# Patient Record
Sex: MALE | Employment: FULL TIME | URBAN - METROPOLITAN AREA
[De-identification: names, ages, dates, MRNs, and addresses within clinical notes are randomized per-mention and may not be internally consistent; named-entity substitution may affect disease eponyms.]

---

## 2017-02-12 ENCOUNTER — TELEPHONE (OUTPATIENT)
Dept: FAMILY MEDICINE CLINIC | Facility: CLINIC | Age: 52
End: 2017-02-12

## 2017-02-13 RX ORDER — LISINOPRIL 10 MG/1
TABLET ORAL
Qty: 90 TABLET | Refills: 0 | Status: SHIPPED | OUTPATIENT
Start: 2017-02-13 | End: 2017-03-10

## 2017-03-02 NOTE — TELEPHONE ENCOUNTER
Pt has appt for 3/10 for med check / bp check however he has made his annual physical for 5/1 can he cancel the 3/10 appt?

## 2017-03-10 ENCOUNTER — OFFICE VISIT (OUTPATIENT)
Dept: FAMILY MEDICINE CLINIC | Facility: CLINIC | Age: 52
End: 2017-03-10

## 2017-03-10 VITALS
BODY MASS INDEX: 26 KG/M2 | TEMPERATURE: 97 F | RESPIRATION RATE: 16 BRPM | HEART RATE: 60 BPM | SYSTOLIC BLOOD PRESSURE: 122 MMHG | WEIGHT: 195 LBS | DIASTOLIC BLOOD PRESSURE: 74 MMHG

## 2017-03-10 DIAGNOSIS — Z85.46 PERSONAL HISTORY OF PROSTATE CANCER: ICD-10-CM

## 2017-03-10 DIAGNOSIS — E78.00 PURE HYPERCHOLESTEROLEMIA: ICD-10-CM

## 2017-03-10 DIAGNOSIS — I10 ESSENTIAL HYPERTENSION: Primary | ICD-10-CM

## 2017-03-10 DIAGNOSIS — Z13.0 SCREENING, ANEMIA, DEFICIENCY, IRON: ICD-10-CM

## 2017-03-10 PROCEDURE — 99214 OFFICE O/P EST MOD 30 MIN: CPT | Performed by: FAMILY MEDICINE

## 2017-03-10 RX ORDER — LISINOPRIL 10 MG/1
10 TABLET ORAL
Qty: 90 TABLET | Refills: 1 | Status: SHIPPED | OUTPATIENT
Start: 2017-03-10 | End: 2017-10-30

## 2017-03-10 NOTE — PROGRESS NOTES
Junior Johnson is a 46year old male. HPI:   Patient presents for recheck of his hypertension. Pt has been taking medications as instructed, no medication side effects, home BP monitoring in the range of 759-822'M systolic and 67'W diastolic.  The patient tibia    • Personal history of malignant neoplasm of prostate    • HIGH BLOOD PRESSURE    • HIGH CHOLESTEROL    • PONV (postoperative nausea and vomiting)           Past Surgical History    OTHER SURGICAL HISTORY  2004    Comment Biopsy Lymph Node    COLON Visit:  Signed Prescriptions Disp Refills    lisinopril 10 MG Oral Tab 90 tablet 1      Sig: Take 1 tablet (10 mg total) by mouth once daily.            Imaging & Consults:  None

## 2017-04-25 ENCOUNTER — LAB ENCOUNTER (OUTPATIENT)
Dept: LAB | Age: 52
End: 2017-04-25
Attending: FAMILY MEDICINE
Payer: COMMERCIAL

## 2017-04-25 DIAGNOSIS — Z85.46 PERSONAL HISTORY OF PROSTATE CANCER: ICD-10-CM

## 2017-04-25 DIAGNOSIS — I10 ESSENTIAL HYPERTENSION: ICD-10-CM

## 2017-04-25 DIAGNOSIS — Z13.0 SCREENING, ANEMIA, DEFICIENCY, IRON: ICD-10-CM

## 2017-04-25 PROCEDURE — 85025 COMPLETE CBC W/AUTO DIFF WBC: CPT | Performed by: FAMILY MEDICINE

## 2017-04-25 PROCEDURE — 80061 LIPID PANEL: CPT | Performed by: FAMILY MEDICINE

## 2017-04-25 PROCEDURE — 36415 COLL VENOUS BLD VENIPUNCTURE: CPT | Performed by: FAMILY MEDICINE

## 2017-04-25 PROCEDURE — 80053 COMPREHEN METABOLIC PANEL: CPT | Performed by: FAMILY MEDICINE

## 2017-04-25 PROCEDURE — 84153 ASSAY OF PSA TOTAL: CPT | Performed by: FAMILY MEDICINE

## 2017-05-01 ENCOUNTER — OFFICE VISIT (OUTPATIENT)
Dept: FAMILY MEDICINE CLINIC | Facility: CLINIC | Age: 52
End: 2017-05-01

## 2017-05-01 VITALS
SYSTOLIC BLOOD PRESSURE: 118 MMHG | HEART RATE: 72 BPM | DIASTOLIC BLOOD PRESSURE: 74 MMHG | TEMPERATURE: 98 F | BODY MASS INDEX: 26.01 KG/M2 | WEIGHT: 192 LBS | HEIGHT: 72 IN | RESPIRATION RATE: 16 BRPM

## 2017-05-01 DIAGNOSIS — Z00.00 ANNUAL PHYSICAL EXAM: Primary | ICD-10-CM

## 2017-05-01 DIAGNOSIS — E78.00 PURE HYPERCHOLESTEROLEMIA: ICD-10-CM

## 2017-05-01 DIAGNOSIS — Z85.46 PERSONAL HISTORY OF PROSTATE CANCER: ICD-10-CM

## 2017-05-01 DIAGNOSIS — I10 ESSENTIAL HYPERTENSION WITH GOAL BLOOD PRESSURE LESS THAN 130/85: ICD-10-CM

## 2017-05-01 PROCEDURE — 99396 PREV VISIT EST AGE 40-64: CPT | Performed by: FAMILY MEDICINE

## 2017-05-01 RX ORDER — PRAVASTATIN SODIUM 10 MG
10 TABLET ORAL NIGHTLY
Qty: 90 TABLET | Refills: 1 | Status: SHIPPED | OUTPATIENT
Start: 2017-05-01 | End: 2017-10-30

## 2017-05-01 NOTE — PROGRESS NOTES
Keyla Kay is a 46year old male who presents for a complete physical exam.   HPI:   Pt has been keeping his HTN under good control. No chest pain or SOB. No sxs related to his prostate ca and removal. Specifically, no incontinence.   He no longer has to Disp: 90 tablet Rfl: 1   lisinopril 10 MG Oral Tab Take 1 tablet (10 mg total) by mouth once daily. Disp: 90 tablet Rfl: 1   Sildenafil Citrate (VIAGRA) 25 MG Oral Tab Take 1 tablet by mouth daily as needed for Erectile Dysfunction.  Disp: 36 tablet Rfl: 3 denies chest pain on exertion  GI: denies abdominal pain,denies heartburn  : denies nocturia or changes in stream  MUSCULOSKELETAL: denies back pain  NEURO: denies headaches  PSYCHE: denies depression or anxiety  HEMATOLOGIC: denies hx of anemia  ENDOCRI months  The patient is asked to return for CPX in 1 year

## 2017-05-21 ENCOUNTER — APPOINTMENT (OUTPATIENT)
Dept: CT IMAGING | Facility: HOSPITAL | Age: 52
DRG: 419 | End: 2017-05-21
Attending: EMERGENCY MEDICINE
Payer: COMMERCIAL

## 2017-05-21 ENCOUNTER — HOSPITAL ENCOUNTER (INPATIENT)
Facility: HOSPITAL | Age: 52
LOS: 5 days | Discharge: HOME | DRG: 419 | End: 2017-05-26
Attending: EMERGENCY MEDICINE | Admitting: HOSPITALIST
Payer: COMMERCIAL

## 2017-05-21 ENCOUNTER — APPOINTMENT (OUTPATIENT)
Dept: ULTRASOUND IMAGING | Facility: HOSPITAL | Age: 52
DRG: 419 | End: 2017-05-21
Attending: EMERGENCY MEDICINE
Payer: COMMERCIAL

## 2017-05-21 DIAGNOSIS — K83.1 BILIARY OBSTRUCTION: Primary | ICD-10-CM

## 2017-05-21 PROCEDURE — 99223 1ST HOSP IP/OBS HIGH 75: CPT | Performed by: HOSPITALIST

## 2017-05-21 PROCEDURE — 74177 CT ABD & PELVIS W/CONTRAST: CPT | Performed by: EMERGENCY MEDICINE

## 2017-05-21 PROCEDURE — 76700 US EXAM ABDOM COMPLETE: CPT | Performed by: EMERGENCY MEDICINE

## 2017-05-21 RX ORDER — MORPHINE SULFATE 4 MG/ML
4 INJECTION, SOLUTION INTRAMUSCULAR; INTRAVENOUS EVERY 30 MIN PRN
Status: DISCONTINUED | OUTPATIENT
Start: 2017-05-21 | End: 2017-05-24 | Stop reason: HOSPADM

## 2017-05-21 RX ORDER — HYDROMORPHONE HYDROCHLORIDE 1 MG/ML
0.8 INJECTION, SOLUTION INTRAMUSCULAR; INTRAVENOUS; SUBCUTANEOUS EVERY 2 HOUR PRN
Status: DISCONTINUED | OUTPATIENT
Start: 2017-05-21 | End: 2017-05-24

## 2017-05-21 RX ORDER — IBUPROFEN 400 MG/1
400 TABLET ORAL EVERY 4 HOURS PRN
Status: DISCONTINUED | OUTPATIENT
Start: 2017-05-21 | End: 2017-05-26

## 2017-05-21 RX ORDER — HYDROMORPHONE HYDROCHLORIDE 1 MG/ML
0.4 INJECTION, SOLUTION INTRAMUSCULAR; INTRAVENOUS; SUBCUTANEOUS EVERY 2 HOUR PRN
Status: DISCONTINUED | OUTPATIENT
Start: 2017-05-21 | End: 2017-05-24

## 2017-05-21 RX ORDER — HYDROMORPHONE HYDROCHLORIDE 1 MG/ML
0.5 INJECTION, SOLUTION INTRAMUSCULAR; INTRAVENOUS; SUBCUTANEOUS EVERY 30 MIN PRN
Status: DISCONTINUED | OUTPATIENT
Start: 2017-05-21 | End: 2017-05-21

## 2017-05-21 RX ORDER — IBUPROFEN 400 MG/1
200 TABLET ORAL EVERY 4 HOURS PRN
Status: DISCONTINUED | OUTPATIENT
Start: 2017-05-21 | End: 2017-05-26

## 2017-05-21 RX ORDER — ONDANSETRON 2 MG/ML
4 INJECTION INTRAMUSCULAR; INTRAVENOUS EVERY 4 HOURS PRN
Status: DISCONTINUED | OUTPATIENT
Start: 2017-05-21 | End: 2017-05-21

## 2017-05-21 RX ORDER — SODIUM CHLORIDE 9 MG/ML
INJECTION, SOLUTION INTRAVENOUS CONTINUOUS
Status: DISCONTINUED | OUTPATIENT
Start: 2017-05-21 | End: 2017-05-26

## 2017-05-21 RX ORDER — IBUPROFEN 600 MG/1
600 TABLET ORAL EVERY 4 HOURS PRN
Status: DISCONTINUED | OUTPATIENT
Start: 2017-05-21 | End: 2017-05-26

## 2017-05-21 RX ORDER — HYDROMORPHONE HYDROCHLORIDE 1 MG/ML
0.2 INJECTION, SOLUTION INTRAMUSCULAR; INTRAVENOUS; SUBCUTANEOUS EVERY 2 HOUR PRN
Status: DISCONTINUED | OUTPATIENT
Start: 2017-05-21 | End: 2017-05-24

## 2017-05-21 RX ORDER — LISINOPRIL 10 MG/1
10 TABLET ORAL DAILY
Status: DISCONTINUED | OUTPATIENT
Start: 2017-05-21 | End: 2017-05-26

## 2017-05-21 RX ORDER — SODIUM CHLORIDE 9 MG/ML
INJECTION, SOLUTION INTRAVENOUS CONTINUOUS
Status: ACTIVE | OUTPATIENT
Start: 2017-05-21 | End: 2017-05-22

## 2017-05-21 RX ORDER — ONDANSETRON 2 MG/ML
4 INJECTION INTRAMUSCULAR; INTRAVENOUS EVERY 4 HOURS PRN
Status: DISCONTINUED | OUTPATIENT
Start: 2017-05-21 | End: 2017-05-26

## 2017-05-21 NOTE — ED INITIAL ASSESSMENT (HPI)
Pt here with right sided epigastric pain stating \"its my gallbladder\". Pt stating onset Friday morning after eating macaroni and cheese, chips and popcorn Thursday night. Pt stating about 6mos ago he was dx gallstone but then it wasn't seen on CT.  Liver

## 2017-05-21 NOTE — CONSULTS
[facility] Patient Status:  Emergency   Date of Birth 10/9/1965 MRN [id number]   Location [facility] Attending Azul Ramirez MD   [facility] Day [facility] 0 PCP Ming Siu MD     Vic 1 tablet by mouth daily as needed for Erectile Dysfunction. , Start Date 3/30/15, End Date , Taking? , Authorizing Provider Aviva Gaxiola MD          Current Facility-Administered Medications:  morphINE sulfate (PF) 4 MG/ML injection 4 mg 4 mg Aviva Gaxiola COMPLETE (CPT=76700), 11/22/2016, 12:34.      INDICATIONS:  abdominal problems      TECHNIQUE:  Real time gray-scale ultrasound was used to evaluate the abdomen.  The exam includes images of the liver, gallbladder, common bile duct, pancreas, spleen, kidne or focal lesion.     KIDNEYS:  Normal.  No mass, obstruction, or calcification.     ADRENALS:  Normal.  No mass or enlargement.     AORTA/VASCULAR:  Normal.  No aneurysm or dissection.     RETROPERITONEUM:  Normal.  No mass or adenopathy.     BOWEL/MESENTE

## 2017-05-21 NOTE — ED PROVIDER NOTES
Patient Seen in: [facility] Emergency Department    History   Patient presents with:  Abdomen/Flank Pain (GI/)    Stated Complaint: abdominal pain    HPI    Patient is a 49-year-old with a history of hypertension, high cholesterol.   He presents fo carcinoma [Other] [OTHER] Other      family hx   • Breast Cancer Mother    • Cancer Father      Colon   • Diabetes Father    • Cancer Mother      skin   • Stroke Maternal Grandfather          Smoking Status: Never Smoker                      Smokeless Stat LIPASE - Normal   CBC WITH DIFFERENTIAL WITH PLATELET    Narrative: The following orders were created for panel order CBC WITH DIFFERENTIAL WITH PLATELET.   Procedure                               Abnormality         Status                     ------- thickening, lesion, or calculus.    PELVIC NODES:  Normal.  No adenopathy.    PELVIC ORGANS:  Normal.  No visible mass.  Pelvic organs appropriate for patient age.    BONES:  Normal.  No bony lesion or fracture.    LUNG BASES:  Normal.  No visible pulmonar

## 2017-05-21 NOTE — ED NOTES
Dr. Shamika Van and Dr. Re Guadalupe rto bs for eval . Pt aware of admit. Family at bs.  Pt denies pain at this time

## 2017-05-21 NOTE — H&P
[facility]  History and Physical     Alveta How Patient Status:  Emergency    10/9/1965 MRN [id number]   Location 6[address] [facility] Attending Liliana Mcmanus MD   Hosp Day # 0 PCP Erinn Lyons MD     Chief Complaint: [address]    COLONOSCOPY  10/18/2013    Comment Procedure: COLONOSCOPY;  Surgeon: Rosa M Hernandez MD;  Location: [facility] ENDOSCOPY     Social History:  reports that he has never smoked.  He has never used smokeless tobacco. He repor extremities. Extremities: No edema. No cyanosis. Integument: No rashes or lesions. Psychiatric: Appropriate mood and affect.   Diagnostic Data:    Labs:  Recent Labs   Lab  05/21/17   1038   WBC  5.5   HGB  17.1*   MCV  88.2   PLT  196.0     Recent Labs

## 2017-05-22 ENCOUNTER — APPOINTMENT (OUTPATIENT)
Dept: MRI IMAGING | Facility: HOSPITAL | Age: 52
DRG: 419 | End: 2017-05-22
Attending: HOSPITALIST
Payer: COMMERCIAL

## 2017-05-22 PROCEDURE — 74183 MRI ABD W/O CNTR FLWD CNTR: CPT | Performed by: HOSPITALIST

## 2017-05-22 PROCEDURE — 99254 IP/OBS CNSLTJ NEW/EST MOD 60: CPT | Performed by: SURGERY

## 2017-05-22 PROCEDURE — 99232 SBSQ HOSP IP/OBS MODERATE 35: CPT | Performed by: HOSPITALIST

## 2017-05-22 NOTE — PROGRESS NOTES
[facility]  Progress Note     Rose Gonsalez Patient Status:  Observation    10/9/1965 MRN [id number]   [facility] 3SW-A Attending Becky Mccarthy MD   Hosp Day # 1 PCP Viktoriya Shaw MD     Chief Complaint: Abd pain  S:  Pain im this point Mr. Saw Hayes is expected to be discharge to: Kylie Michele MD

## 2017-05-22 NOTE — PROGRESS NOTES
[facility]    Progress Note    Keyla Kay Patient Status:  Observation    10/9/1965 MRN [id number]   [facility] 3SW-A Attending Jose Rebolledo MD   Hosp Day # 1 PCP Krysta Barajas MD     Subjective:  Keyla Kay is a(n) 51 year considered regardless.   Discussed with pt who is in agreement with the plan    Lisa Billings  5/22/2017  3:42 PM

## 2017-05-22 NOTE — CONSULTS
Report of Consultation    Benedict Edgar Patient Status:  Observation    10/9/1965 MRN [id number]   [facility] 3SW-A Attending Frederic Gamboa MD   Hosp Day # 1 PCP Carissa Merrill MD     Reason for Consultation:  Right upper quadrant abd stopped for that reason.       History:  Past Medical History   Diagnosis Date   • Benign neoplasm of colon    • Unspecified part of closed fracture of clavicle    • Closed fracture of unspecified part of tibia    • Personal history of malignant neoplasm of injection 0.4 mg, 0.4 mg, Intravenous, Q2H PRN **OR** HYDROmorphone HCl PF (DILAUDID) 1 MG/ML injection 0.8 mg, 0.8 mg, Intravenous, Q2H PRN    Review of Systems:    Allergic/Immuno:  Review of patient's allergies indicates no known allergies.   He has an i tenderness to palpation with deep palpation into the right upper quadrant, bowel sounds are present and of normal activity and normal pitch. No rebound or guarding. No signs of peritonitis. No ascites. No appreciable hernias.     Extremities:  No lower CONTRAST, NO ORAL (ER)      COMPARISON:  [facility] CT ABD(C/S)/PELVIS(C) (SET), 1/28/2010, 8:54. [facility] US ABDOMEN COMPLETE (CPT=76700), 5/21/2017, 11:01.       INDICATIONS:  abdominal pain      TECHNIQUE:  CT scanning was performed from the dome of the d Approved by: Meek Lujan MD            Impression:  Patient Active Problem List:     Essential hypertension     Hyperlipidemia     Annual physical exam     Personal history of prostate cancer     Screening, anemia, deficiency, iron     Screening f

## 2017-05-22 NOTE — PAYOR COMM NOTE
Attending Physician: Laurie Granados MD    Review Type: ADMISSION   Reviewer: Madelyn Mckay       Date: May 22, 2017 - 9:08 AM  Payor: [id number] [facility] Number: N/A  Admit date: 5/21/2017 10:12 AM   Admitted from Emergency Dept.: Yes MD;  Location:  ENDOSCOPY      Medications :   Pravastatin Sodium 10 MG Oral Tab,  Take 1 tablet (10 mg total) by mouth nightly.   lisinopril 10 MG Oral Tab,  Take 1 tablet (10 mg total) by mouth once daily.     Family History    Problem  Relation  Age o All other components within normal limits    CBC W/ DIFFERENTIAL - Abnormal; Notable for the following:       HGB  17.1 (*)        All other components within normal limits    LIPASE - Normal    CBC WITH DIFFERENTIAL WITH PLATELET      EKG  Rate, intervals pain is essentially resolved.   MDM    35-year-old with very typical biliary symptoms and now with elevated AST, ALT and bilirubin of 3.1.  Normal white count.  Normal lipase.  No abnormalities on ultrasound or CT.  He will be admitted for likely MRCP, cons and emesis earlier today.  He denies hematemesis, melena or hematochezia.  He denies jaundice or icterus.  He denies fevers or chills.   Patient states he has been recently switched to a subsequent cholesterol medication over the last couple of weeks.  The Abnormal; Notable for the following:      (*)     Alt 287 (*)     Sodium 145 (*)     All other components within normal limits   HEPATIC FUNCTION PANEL (7) - Abnormal; Notable for the following:      (*)     Alt 287 (*)     Bilirubin, Direct

## 2017-05-22 NOTE — PLAN OF CARE
GASTROINTESTINAL - ADULT    • Minimal or absence of nausea and vomiting Progressing        PAIN - ADULT    • Verbalizes/displays adequate comfort level or patient's stated pain goal Progressing        Abdomen soft and not tender on palpation.   Bretties seth

## 2017-05-23 PROCEDURE — 99232 SBSQ HOSP IP/OBS MODERATE 35: CPT | Performed by: HOSPITALIST

## 2017-05-23 PROCEDURE — 99232 SBSQ HOSP IP/OBS MODERATE 35: CPT | Performed by: SURGERY

## 2017-05-23 NOTE — PLAN OF CARE
GASTROINTESTINAL - ADULT    • Minimal or absence of nausea and vomiting Progressing        PAIN - ADULT    • Verbalizes/displays adequate comfort level or patient's stated pain goal Progressing        Patient maintained on NPO until cleared by GI and surge

## 2017-05-23 NOTE — PROGRESS NOTES
[facility]    Progress Note    Corey Borjas Patient Status:  Inpatient    10/9/1965 MRN [id number]   [facility] 3SW-A Attending Ban Pablo MD   Hosp Day # 2 PCP Iline Felty, MD     Subjective:  Corey Borjas is a(n) 46 year o

## 2017-05-23 NOTE — PROGRESS NOTES
[facility]  Progress Note     Juinor Even Patient Status:  Observation    10/9/1965 MRN [id number]   [facility] 3SW-A Attending Long Kwong MD   Hosp Day # 2 PCP Kesha Guillermo MD     Chief Complaint: Abd pain  S:  No Pain pending post-op course   At this point Mr. Manfred Helms is expected to be discharge to: Enedina Burrell MD

## 2017-05-23 NOTE — PROGRESS NOTES
[facility]  Progress Note    Keyla Kay Patient Status:  Observation    10/9/1965 MRN [id number]   [facility] 3SW-A Attending Jose Rebolledo MD   Hosp Day # 2 PCP Krysta Barajas MD     Subjective: Patient denies abdominal pain wi patient proceed for laparoscopic possible open cholecystectomy with cholangiogram I discussed with the patient the risks of surgery to include bleeding infection pneumonia DVT PE and injury to the common bile duct with a second major operation to repair al

## 2017-05-24 ENCOUNTER — ANESTHESIA (OUTPATIENT)
Dept: SURGERY | Facility: HOSPITAL | Age: 52
End: 2017-05-24

## 2017-05-24 ENCOUNTER — APPOINTMENT (OUTPATIENT)
Dept: GENERAL RADIOLOGY | Facility: HOSPITAL | Age: 52
DRG: 419 | End: 2017-05-24
Attending: SURGERY
Payer: COMMERCIAL

## 2017-05-24 ENCOUNTER — SURGERY (OUTPATIENT)
Age: 52
End: 2017-05-24

## 2017-05-24 ENCOUNTER — ANESTHESIA EVENT (OUTPATIENT)
Dept: SURGERY | Facility: HOSPITAL | Age: 52
End: 2017-05-24

## 2017-05-24 PROCEDURE — 76000 FLUOROSCOPY <1 HR PHYS/QHP: CPT | Performed by: SURGERY

## 2017-05-24 PROCEDURE — 0FT44ZZ RESECTION OF GALLBLADDER, PERCUTANEOUS ENDOSCOPIC APPROACH: ICD-10-PCS | Performed by: SURGERY

## 2017-05-24 PROCEDURE — 99232 SBSQ HOSP IP/OBS MODERATE 35: CPT | Performed by: INTERNAL MEDICINE

## 2017-05-24 PROCEDURE — 47563 LAPARO CHOLECYSTECTOMY/GRAPH: CPT | Performed by: SURGERY

## 2017-05-24 PROCEDURE — BF131ZZ FLUOROSCOPY OF GALLBLADDER AND BILE DUCTS USING LOW OSMOLAR CONTRAST: ICD-10-PCS | Performed by: SURGERY

## 2017-05-24 RX ORDER — SODIUM CHLORIDE, SODIUM LACTATE, POTASSIUM CHLORIDE, CALCIUM CHLORIDE 600; 310; 30; 20 MG/100ML; MG/100ML; MG/100ML; MG/100ML
INJECTION, SOLUTION INTRAVENOUS CONTINUOUS
Status: DISCONTINUED | OUTPATIENT
Start: 2017-05-24 | End: 2017-05-26

## 2017-05-24 RX ORDER — ENALAPRILAT 2.5 MG/2ML
1.25 INJECTION INTRAVENOUS ONCE
Status: COMPLETED | OUTPATIENT
Start: 2017-05-24 | End: 2017-05-24

## 2017-05-24 RX ORDER — DEXAMETHASONE SODIUM PHOSPHATE 4 MG/ML
4 VIAL (ML) INJECTION AS NEEDED
Status: DISCONTINUED | OUTPATIENT
Start: 2017-05-24 | End: 2017-05-24 | Stop reason: HOSPADM

## 2017-05-24 RX ORDER — MIDAZOLAM HYDROCHLORIDE 1 MG/ML
1 INJECTION INTRAMUSCULAR; INTRAVENOUS EVERY 5 MIN PRN
Status: DISCONTINUED | OUTPATIENT
Start: 2017-05-24 | End: 2017-05-24 | Stop reason: HOSPADM

## 2017-05-24 RX ORDER — HYDROCODONE BITARTRATE AND ACETAMINOPHEN 5; 325 MG/1; MG/1
2 TABLET ORAL AS NEEDED
Status: DISCONTINUED | OUTPATIENT
Start: 2017-05-24 | End: 2017-05-24 | Stop reason: HOSPADM

## 2017-05-24 RX ORDER — ONDANSETRON 2 MG/ML
4 INJECTION INTRAMUSCULAR; INTRAVENOUS AS NEEDED
Status: DISCONTINUED | OUTPATIENT
Start: 2017-05-24 | End: 2017-05-24 | Stop reason: HOSPADM

## 2017-05-24 RX ORDER — HYDROCODONE BITARTRATE AND ACETAMINOPHEN 5; 325 MG/1; MG/1
1 TABLET ORAL EVERY 4 HOURS PRN
Status: DISCONTINUED | OUTPATIENT
Start: 2017-05-24 | End: 2017-05-26

## 2017-05-24 RX ORDER — HYDROMORPHONE HYDROCHLORIDE 1 MG/ML
0.4 INJECTION, SOLUTION INTRAMUSCULAR; INTRAVENOUS; SUBCUTANEOUS EVERY 5 MIN PRN
Status: DISCONTINUED | OUTPATIENT
Start: 2017-05-24 | End: 2017-05-24 | Stop reason: HOSPADM

## 2017-05-24 RX ORDER — HYDROCODONE BITARTRATE AND ACETAMINOPHEN 5; 325 MG/1; MG/1
1 TABLET ORAL AS NEEDED
Status: DISCONTINUED | OUTPATIENT
Start: 2017-05-24 | End: 2017-05-24 | Stop reason: HOSPADM

## 2017-05-24 RX ORDER — ACETAMINOPHEN 325 MG/1
650 TABLET ORAL EVERY 4 HOURS PRN
Status: DISCONTINUED | OUTPATIENT
Start: 2017-05-24 | End: 2017-05-26

## 2017-05-24 RX ORDER — HYDROMORPHONE HYDROCHLORIDE 1 MG/ML
0.8 INJECTION, SOLUTION INTRAMUSCULAR; INTRAVENOUS; SUBCUTANEOUS EVERY 2 HOUR PRN
Status: DISCONTINUED | OUTPATIENT
Start: 2017-05-24 | End: 2017-05-26

## 2017-05-24 RX ORDER — HYDROCODONE BITARTRATE AND ACETAMINOPHEN 5; 325 MG/1; MG/1
2 TABLET ORAL EVERY 4 HOURS PRN
Status: DISCONTINUED | OUTPATIENT
Start: 2017-05-24 | End: 2017-05-26

## 2017-05-24 RX ORDER — MEPERIDINE HYDROCHLORIDE 25 MG/ML
12.5 INJECTION INTRAMUSCULAR; INTRAVENOUS; SUBCUTANEOUS AS NEEDED
Status: DISCONTINUED | OUTPATIENT
Start: 2017-05-24 | End: 2017-05-24 | Stop reason: HOSPADM

## 2017-05-24 RX ORDER — HYDRALAZINE HYDROCHLORIDE 20 MG/ML
10 INJECTION INTRAMUSCULAR; INTRAVENOUS EVERY 4 HOURS PRN
Status: DISCONTINUED | OUTPATIENT
Start: 2017-05-24 | End: 2017-05-26

## 2017-05-24 RX ORDER — HYDROMORPHONE HYDROCHLORIDE 1 MG/ML
0.4 INJECTION, SOLUTION INTRAMUSCULAR; INTRAVENOUS; SUBCUTANEOUS EVERY 2 HOUR PRN
Status: DISCONTINUED | OUTPATIENT
Start: 2017-05-24 | End: 2017-05-26

## 2017-05-24 RX ORDER — ENOXAPARIN SODIUM 100 MG/ML
40 INJECTION SUBCUTANEOUS DAILY
Status: DISCONTINUED | OUTPATIENT
Start: 2017-05-24 | End: 2017-05-26

## 2017-05-24 RX ORDER — DIPHENHYDRAMINE HYDROCHLORIDE 50 MG/ML
12.5 INJECTION INTRAMUSCULAR; INTRAVENOUS AS NEEDED
Status: DISCONTINUED | OUTPATIENT
Start: 2017-05-24 | End: 2017-05-24 | Stop reason: HOSPADM

## 2017-05-24 RX ORDER — NALOXONE HYDROCHLORIDE 0.4 MG/ML
80 INJECTION, SOLUTION INTRAMUSCULAR; INTRAVENOUS; SUBCUTANEOUS AS NEEDED
Status: DISCONTINUED | OUTPATIENT
Start: 2017-05-24 | End: 2017-05-24 | Stop reason: HOSPADM

## 2017-05-24 RX ORDER — HYDROMORPHONE HYDROCHLORIDE 1 MG/ML
INJECTION, SOLUTION INTRAMUSCULAR; INTRAVENOUS; SUBCUTANEOUS
Status: COMPLETED
Start: 2017-05-24 | End: 2017-05-24

## 2017-05-24 RX ORDER — BUPIVACAINE HYDROCHLORIDE 5 MG/ML
INJECTION, SOLUTION EPIDURAL; INTRACAUDAL AS NEEDED
Status: DISCONTINUED | OUTPATIENT
Start: 2017-05-24 | End: 2017-05-24 | Stop reason: HOSPADM

## 2017-05-24 NOTE — PROGRESS NOTES
[facility]    Progress Note    Tobi Garcia Patient Status:  Inpatient    10/9/1965 MRN [id number]   [facility] 3SW-A Attending Nabil Barrera MD   Hosp Day # 3 PCP Rochelle Valdes MD     Subjective:  Tobi Garcia is a(n) 46 year ol

## 2017-05-24 NOTE — OPERATIVE REPORT
[facility]    PATIENT'S NAME: Judy Dia PHYSICIAN: Yasmine Agrawal M.D.    OPERATING PHYSICIAN: Vlad Man D.O.   PATIENT ACCOUNT#:   [de-identified]    LOCATION:  PACU [address] [place] [address] [facility] #:   [id number]       DATE OF BIRTH: advanced into the cystic duct, and C-arm cholangiography carried out demonstrating evidence of apparent filling defect in the distal common bile duct. This defect had a somewhat lobular appearance.   It was uncertain whether this represented mass or stones

## 2017-05-24 NOTE — PROGRESS NOTES
[facility]  Progress Note     Ronald Patel Patient Status:  Inpatient    10/9/1965 MRN [id number]   [facility] 3SW-A Attending Nathalie Lovell MD   Hosp Day # 3 PCP Ana Marie MD     Chief Complaint: abd soreness    S: Patient enzymes - per #1, CMP in am   3. HTN - cont current management   4.  Dyslipidemia - off statin   Quality:  · DVT Prophylaxis: lovenox  · CODE status: full  · Jacob: none  · Central line: none    Estimated date of discharge: TBD  Discharge is dependent on: c

## 2017-05-24 NOTE — ANESTHESIA PREPROCEDURE EVALUATION
PRE-OP EVALUATION    Patient Name: Diana Fox    Pre-op Diagnosis: Cholecystitis [K81.9]    Procedure(s):  LAPAROSCOPIC CHOLECYSTECOMY WITH INTRAOPERATIVE CHOLANGIOGRAM    Surgeon(s) and Role:     * Jabari Betancourt,  - Danny    Pre-op vitals reviewed. 1 tablet (10 mg total) by mouth once daily. Disp: 90 tablet Rfl: 1       Allergies: Review of patient's allergies indicates no known allergies.       Anesthesia Evaluation        Anesthetic Complications           GI/Hepatic/Renal guidelines. Patient has not taken beta blockers in last 24 hours. Post-procedure pain management plan discussed with surgeon and patient.       Plan/risks discussed with: patient and spouse                Present on Admission:   • Essential hypertension

## 2017-05-24 NOTE — ANESTHESIA POSTPROCEDURE EVALUATION
Missael Patient Status:  Inpatient   Age/Gender 46year old male MRN [id number]   [facility] SURGERY Attending Sg Ramírez, [address] [facility] Se Day # 3 PCP Luiz Mckeon MD       Anesthesia Post-op Note    Procedure(s):  LA

## 2017-05-24 NOTE — PLAN OF CARE
Patient returned to unit from PACU at 1525, family present. Patient sleeping on and off. Patient to start on clear liquid diet, minimal to moderate pain to lap site. Plan for NPO at midnight.

## 2017-05-25 ENCOUNTER — APPOINTMENT (OUTPATIENT)
Dept: GENERAL RADIOLOGY | Facility: HOSPITAL | Age: 52
DRG: 419 | End: 2017-05-25
Attending: INTERNAL MEDICINE
Payer: COMMERCIAL

## 2017-05-25 ENCOUNTER — ANESTHESIA EVENT (OUTPATIENT)
Dept: ENDOSCOPY | Facility: HOSPITAL | Age: 52
DRG: 419 | End: 2017-05-25
Payer: COMMERCIAL

## 2017-05-25 ENCOUNTER — SURGERY (OUTPATIENT)
Age: 52
End: 2017-05-25

## 2017-05-25 ENCOUNTER — ANESTHESIA (OUTPATIENT)
Dept: ENDOSCOPY | Facility: HOSPITAL | Age: 52
DRG: 419 | End: 2017-05-25
Payer: COMMERCIAL

## 2017-05-25 PROCEDURE — 0FC98ZZ EXTIRPATION OF MATTER FROM COMMON BILE DUCT, VIA NATURAL OR ARTIFICIAL OPENING ENDOSCOPIC: ICD-10-PCS | Performed by: INTERNAL MEDICINE

## 2017-05-25 PROCEDURE — 74328 X-RAY BILE DUCT ENDOSCOPY: CPT | Performed by: INTERNAL MEDICINE

## 2017-05-25 PROCEDURE — BF4CZZZ ULTRASONOGRAPHY OF HEPATOBILIARY SYSTEM, ALL: ICD-10-PCS | Performed by: INTERNAL MEDICINE

## 2017-05-25 PROCEDURE — 99232 SBSQ HOSP IP/OBS MODERATE 35: CPT | Performed by: INTERNAL MEDICINE

## 2017-05-25 RX ORDER — LEVOFLOXACIN 5 MG/ML
INJECTION, SOLUTION INTRAVENOUS
Status: DISPENSED
Start: 2017-05-25 | End: 2017-05-25

## 2017-05-25 RX ORDER — LEVOFLOXACIN 5 MG/ML
INJECTION, SOLUTION INTRAVENOUS
Status: DISCONTINUED | OUTPATIENT
Start: 2017-05-25 | End: 2017-05-26

## 2017-05-25 NOTE — DIETARY NOTE
NUTRITION INITIAL ASSESSMENT    Pt is at moderate nutrition risk. Pt does not meet malnutrition criteria. NUTRITION DIAGNOSIS/PROBLEM:    Inadequate oral intake related to inability to consume sufficient energy as evidenced by NPO x 4 days.      Rebecca Mcdermott discretion    MONITOR AND EVALUATE/NUTRITION GOALS:    1. PO intake to meet at least 75% patient nutrition prescription  3. No signs of skin breakdown  4. Maintain lean body mass  6.  Alternative means of nutrition at goal to meet 100% patient nutrition pre

## 2017-05-25 NOTE — H&P
History & Physical Examination    Patient Name: Francesco Rivera  MRN: [id number]  CSN: [id number]  YOB: 1965    Diagnosis: Biliary obstruction [K83.1]      Present Illness:  Francesco Rivera is a 46year old male is here Biliary obstruction [K83.1]. Continuous   ibuprofen (MOTRIN) tab 200 mg 200 mg Oral Q4H PRN   Or      ibuprofen (MOTRIN) tab 400 mg 400 mg Oral Q4H PRN   Or      ibuprofen (MOTRIN) tab 600 mg 600 mg Oral Q4H PRN       Allergies: No Known Allergies        Past Surgical History    OTHER

## 2017-05-25 NOTE — OPERATIVE REPORT
[address] [facility] REPORT   PATIENT NAME: Jazmyne Reyes  MRN: [id number]  DATE OF OPERATION: 5/25/2017  PREOPERATIVE DIAGNOSIS: choledocholithiasis on intra-operative cholangiogram post cholecystectomy  POSTOPERATIVE DIAGNOSIS:    1.   Choledocholithias interrogated next. The pancreatic duct in the head of pancreas was normal.  It was seen going from the ampulla to the body of the pancreas around the confluence excluding the possibility of pancreas divisum.   The head of pancreas was normal without mass o inflated at 10 mm. Occlusion cholangiogram failed to show any more filling defect. Excellent biliary drainage was noted. Scope was withdrawn from the patient and patient tolerated the procedure well. FINDINGS   1.  Removal of small CBD stone  RECOMMENDA

## 2017-05-25 NOTE — PLAN OF CARE
Call to endo/GI lab patient estimated time for EUS with ERCP at approx 1700. Writer instructed to keep patient NPO, sips with meds. Patient NPO since midnight.

## 2017-05-25 NOTE — PROGRESS NOTES
[facility]  Progress Note     Stephanie Able Patient Status:  Inpatient    10/9/1965 MRN [id number]   [facility] 3SW-A Attending Ezio Arias MD   Hosp Day # 4 PCP Vivi Craft MD     Chief Complaint: POD 1 felipe oliveira    S: Genie 3. Pain control  4. NPO   5. IVF  2. Elevated liver enzymes - per #1, improving   3. HTN - cont current management    4.  Dyslipidemia - off statin  Quality:  · DVT Prophylaxis: lovenox  · CODE status: full  · Jacob: none  · Central line: none    Estimate

## 2017-05-25 NOTE — ANESTHESIA POSTPROCEDURE EVALUATION
Missael Patient Status:  Inpatient   Age/Gender 46year old male MRN [id number]   Location [facility]. Attending Ezio Arias MD   [facility] Day # 4 PCP Vivi Craft MD       Anesthesia Post-op Note    Procedure(s):

## 2017-05-25 NOTE — ANESTHESIA PREPROCEDURE EVALUATION
PRE-OP EVALUATION    Patient Name: Jailyn Marmolejo    Pre-op Diagnosis: EUS POSS. ERCP    Procedure(s):  ENDOSCOPIC ULTRASOUND POSSIBLE ENDOSCOPIC RETROGRADE CHOLANGIOPANCREATOGRAPHY    Surgeon(s) and Role:     * Peter Fuller MD - Primary    Pre-op vital HYDROmorphone HCl PF (DILAUDID) 1 MG/ML injection 0.4 mg 0.4 mg Intravenous Q2H PRN   Or      HYDROmorphone HCl PF (DILAUDID) 1 MG/ML injection 0.8 mg 0.8 mg Intravenous Q2H PRN   Enoxaparin Sodium (LOVENOX) 40 MG/0.4ML injection 40 mg 40 mg Subcutaneous complications  History of: PONV       GI/Hepatic/Renal  Comment: Cholelithiasis/cholecystitis/choledocholithiasis                               Cardiovascular      ECG reviewed.             (+) hypertension   (+) hyperlipidemia                    (-) angina guidelines. Patient has not taken beta blockers in last 24 hours. Post-procedure pain management plan discussed with surgeon and patient.       Plan/risks discussed with: patient                Present on Admission:   • Essential hypertension  • Hyperlipi

## 2017-05-25 NOTE — PLAN OF CARE
Patient/Family Goals    • Patient/Family Short Term Goal Completed          GASTROINTESTINAL - ADULT    • Minimal or absence of nausea and vomiting Progressing        PAIN - ADULT    • Verbalizes/displays adequate comfort level or patient's stated pain goa

## 2017-05-25 NOTE — PROGRESS NOTES
[facility]  Progress Note    Prerna Arias Patient Status:  Inpatient    10/9/1965 MRN [id number]   [facility] 3SW-A Attending Nic Portillo MD   Hosp Day # 4 PCP Joss Lewis MD     Subjective:  Pt feels much better.   Is eating c

## 2017-05-26 VITALS
RESPIRATION RATE: 16 BRPM | WEIGHT: 190 LBS | HEIGHT: 73 IN | HEART RATE: 59 BPM | OXYGEN SATURATION: 98 % | DIASTOLIC BLOOD PRESSURE: 85 MMHG | TEMPERATURE: 99 F | SYSTOLIC BLOOD PRESSURE: 144 MMHG | BODY MASS INDEX: 25.18 KG/M2

## 2017-05-26 PROCEDURE — 99239 HOSP IP/OBS DSCHRG MGMT >30: CPT | Performed by: INTERNAL MEDICINE

## 2017-05-26 RX ORDER — HYDROCODONE BITARTRATE AND ACETAMINOPHEN 5; 325 MG/1; MG/1
1 TABLET ORAL EVERY 4 HOURS PRN
Qty: 30 TABLET | Refills: 0 | Status: SHIPPED | OUTPATIENT
Start: 2017-05-26 | End: 2017-06-05 | Stop reason: ALTCHOICE

## 2017-05-26 NOTE — PROGRESS NOTES
[facility]  Progress Note    Vinh Hands Patient Status:  Inpatient    10/9/1965 MRN [id number]   [facility] 3SW-A Attending Sg Ramírez MD   Hosp Day # 5 PCP Luiz Mckeon MD     Subjective:  Pt feels very well, tolerating a d

## 2017-05-30 ENCOUNTER — TELEPHONE (OUTPATIENT)
Dept: FAMILY MEDICINE CLINIC | Facility: CLINIC | Age: 52
End: 2017-05-30

## 2017-05-30 ENCOUNTER — PATIENT OUTREACH (OUTPATIENT)
Dept: CASE MANAGEMENT | Age: 52
End: 2017-05-30

## 2017-05-30 DIAGNOSIS — Z02.9 ENCOUNTERS FOR ADMINISTRATIVE PURPOSE: Primary | ICD-10-CM

## 2017-05-30 NOTE — PROGRESS NOTES
Initial Post Discharge Follow Up   Discharge Date: 5/26/17  Contact Date: 5/30/2017    Consent Verification:  Assessment Completed With: Patient  HIPAA Verified? Yes    Discharge Dx:  S/p laparoscopic cholecystectomy on 5/24/17 with Dr. Neema Urban.       Gen mouth daily as needed for Erectile Dysfunction. Disp: 36 tablet Rfl: 3     • When you were leaving the hospital were any medication changes discussed with you? yes  • If you were prescribed a new medication:   o Was the new medication’s purpose explained? [facility]  [phone]              PCP TCM/HFU appointment: scheduled at D/C within 7-14 days  no--pt waiting for CB from PCP office regarding HFU appt.        NCM Reviewed/scheduled/rescheduled PCP TCM/HFU appointment with pt:  No --p

## 2017-05-30 NOTE — TELEPHONE ENCOUNTER
Pt was in hospital had gall bladder removed needs HFU appt for 1 week which would be Weds please advise when you want to see pt this week     Please advised [phone]

## 2017-06-01 NOTE — PAYOR COMM NOTE
[facility] PSYCHIATRIC [place] [facility]PITAL[facility]  DISCHARGE SUMMARY    Stephon Nevarez Patient Status:  Pablo Morales [birth year]  MRN  [id number]    [facility] 3SW-A  Attending  Sara Humphreys MD    Hosp Day #  5  PCP  Alicia Jaquez MD      Date of Admission: 5/2 pain or shortness of breath, lightheadedness or dizziness.  He denies leg swelling or calf pain. Brief Synopsis:   Patient admitted and underwent lap cholecystectomy. Post Lap tee he required ERCP with stone extraction per GI.    His LFTs improved.  Ab rebound or guarding. Neurologic: No focal neurological deficits. Musculoskeletal: Moves all extremities. Extremities: No edema.   -----------------------------------------------------------------------------------------------  PATIENT DISCHARGE INSTRUCT

## 2017-06-05 ENCOUNTER — OFFICE VISIT (OUTPATIENT)
Dept: FAMILY MEDICINE CLINIC | Facility: CLINIC | Age: 52
End: 2017-06-05

## 2017-06-05 VITALS
SYSTOLIC BLOOD PRESSURE: 104 MMHG | WEIGHT: 189 LBS | TEMPERATURE: 98 F | HEART RATE: 62 BPM | BODY MASS INDEX: 25.6 KG/M2 | RESPIRATION RATE: 16 BRPM | HEIGHT: 72 IN | DIASTOLIC BLOOD PRESSURE: 72 MMHG

## 2017-06-05 DIAGNOSIS — Z90.49 STATUS POST LAPAROSCOPIC CHOLECYSTECTOMY: Primary | ICD-10-CM

## 2017-06-05 DIAGNOSIS — R79.89 ELEVATED LFTS: ICD-10-CM

## 2017-06-05 PROCEDURE — 99495 TRANSJ CARE MGMT MOD F2F 14D: CPT | Performed by: FAMILY MEDICINE

## 2017-06-05 NOTE — PROGRESS NOTES
HPI:    Corey Borjas is a 46year old male here today for hospital follow up.    He was discharged from Inpatient hospital, [facility] to Home   Admission Date: 5/21/17   Discharge Date: 5/26/17  Hospital Discharge Diagnosis: Acute cholecystitis with  his initial liver function elevation improved. He was able be discharged home with instructions to follow-up with surgery and his PCP. Allergies:  He has No Known Allergies.     Current Meds:    Current Outpatient Prescriptions on File Prior to Visit: denies heartburn, denies diarrhea  MUSCULOSKELETAL: denies pain, normal range of motion of extremities  NEURO: denies headaches, denies dizziness, denies weakness  PSYCHE: denies depression or anxiety  HEMATOLOGIC: denies hx of anemia, or bruising, denies Morbidity, and/or Mortality: moderate    Overall Risk:   moderate    Patient seen within 14 days from date of discharge.      Shakeel Stone MD, 6/5/2017

## 2017-06-07 ENCOUNTER — OFFICE VISIT (OUTPATIENT)
Dept: SURGERY | Facility: CLINIC | Age: 52
End: 2017-06-07

## 2017-06-07 ENCOUNTER — APPOINTMENT (OUTPATIENT)
Dept: LAB | Age: 52
End: 2017-06-07
Attending: FAMILY MEDICINE
Payer: COMMERCIAL

## 2017-06-07 VITALS
WEIGHT: 189 LBS | RESPIRATION RATE: 14 BRPM | HEART RATE: 70 BPM | HEIGHT: 72 IN | TEMPERATURE: 98 F | SYSTOLIC BLOOD PRESSURE: 131 MMHG | DIASTOLIC BLOOD PRESSURE: 87 MMHG | BODY MASS INDEX: 25.6 KG/M2

## 2017-06-07 DIAGNOSIS — R79.89 ELEVATED LFTS: ICD-10-CM

## 2017-06-07 DIAGNOSIS — K80.50 CHOLEDOCHOLITHIASIS: Primary | ICD-10-CM

## 2017-06-07 PROCEDURE — 80076 HEPATIC FUNCTION PANEL: CPT | Performed by: FAMILY MEDICINE

## 2017-06-07 PROCEDURE — 36415 COLL VENOUS BLD VENIPUNCTURE: CPT | Performed by: FAMILY MEDICINE

## 2017-06-07 PROCEDURE — 99024 POSTOP FOLLOW-UP VISIT: CPT | Performed by: SURGERY

## 2017-06-07 NOTE — PROGRESS NOTES
Post Operative Visit Note       Active Problems  No diagnosis found. Chief Complaint   Patient presents with:  Post-Op: 1st PO- Laparoscopic cholecystectomy with intraoperative cholangiography done 5/21/17.           History of Present Illness   Patient History Main Topics    Smoking Status: Never Smoker                      Smokeless Status: Never Used                        Alcohol Use: Yes           9.0 oz/week       15 Standard drinks or equivalent per week       Comment: 2 drinks a day     Drug Use: found.    Plan     Status post arthroscopic cholecystectomy postoperative course is anticipated follow-up as needed       No orders of the defined types were placed in this encounter. Imaging & Referrals   None    Follow Up  No Follow-up on file.

## 2017-06-08 ENCOUNTER — TELEPHONE (OUTPATIENT)
Dept: FAMILY MEDICINE CLINIC | Facility: CLINIC | Age: 52
End: 2017-06-08

## 2017-06-08 DIAGNOSIS — R74.8 ELEVATED LIVER ENZYMES: Primary | ICD-10-CM

## 2017-07-11 ENCOUNTER — APPOINTMENT (OUTPATIENT)
Dept: LAB | Age: 52
End: 2017-07-11
Attending: FAMILY MEDICINE
Payer: COMMERCIAL

## 2017-07-11 DIAGNOSIS — R74.8 ELEVATED LIVER ENZYMES: ICD-10-CM

## 2017-07-11 LAB
ALBUMIN SERPL-MCNC: 3.7 G/DL (ref 3.5–4.8)
ALP LIVER SERPL-CCNC: 46 U/L (ref 45–117)
ALT SERPL-CCNC: 38 U/L (ref 17–63)
AST SERPL-CCNC: 23 U/L (ref 15–41)
BILIRUB DIRECT SERPL-MCNC: 0.3 MG/DL (ref 0.1–0.5)
BILIRUB SERPL-MCNC: 0.9 MG/DL (ref 0.1–2)
M PROTEIN MFR SERPL ELPH: 6.8 G/DL (ref 6.1–8.3)

## 2017-07-11 PROCEDURE — 80076 HEPATIC FUNCTION PANEL: CPT | Performed by: FAMILY MEDICINE

## 2017-07-11 PROCEDURE — 36415 COLL VENOUS BLD VENIPUNCTURE: CPT | Performed by: FAMILY MEDICINE

## 2017-08-14 ENCOUNTER — APPOINTMENT (OUTPATIENT)
Dept: LAB | Age: 52
End: 2017-08-14
Attending: FAMILY MEDICINE
Payer: COMMERCIAL

## 2017-08-14 DIAGNOSIS — E78.00 PURE HYPERCHOLESTEROLEMIA: ICD-10-CM

## 2017-08-14 DIAGNOSIS — I10 ESSENTIAL HYPERTENSION WITH GOAL BLOOD PRESSURE LESS THAN 130/85: ICD-10-CM

## 2017-08-14 LAB
ALBUMIN SERPL-MCNC: 4.1 G/DL (ref 3.5–4.8)
ALP LIVER SERPL-CCNC: 53 U/L (ref 45–117)
ALT SERPL-CCNC: 38 U/L (ref 17–63)
AST SERPL-CCNC: 19 U/L (ref 15–41)
BILIRUB SERPL-MCNC: 1 MG/DL (ref 0.1–2)
BUN BLD-MCNC: 15 MG/DL (ref 8–20)
CALCIUM BLD-MCNC: 9.1 MG/DL (ref 8.3–10.3)
CHLORIDE: 106 MMOL/L (ref 101–111)
CHOLEST SMN-MCNC: 185 MG/DL (ref ?–200)
CO2: 28 MMOL/L (ref 22–32)
CREAT BLD-MCNC: 0.91 MG/DL (ref 0.7–1.3)
GLUCOSE BLD-MCNC: 92 MG/DL (ref 70–99)
HDLC SERPL-MCNC: 67 MG/DL (ref 45–?)
HDLC SERPL: 2.76 {RATIO} (ref ?–4.97)
LDLC SERPL CALC-MCNC: 107 MG/DL (ref ?–130)
LDLC SERPL-MCNC: 11 MG/DL (ref 5–40)
M PROTEIN MFR SERPL ELPH: 7.3 G/DL (ref 6.1–8.3)
NONHDLC SERPL-MCNC: 118 MG/DL (ref ?–130)
POTASSIUM SERPL-SCNC: 4.6 MMOL/L (ref 3.6–5.1)
SODIUM SERPL-SCNC: 140 MMOL/L (ref 136–144)
TRIGLYCERIDES: 56 MG/DL (ref ?–150)

## 2017-08-14 PROCEDURE — 80053 COMPREHEN METABOLIC PANEL: CPT | Performed by: FAMILY MEDICINE

## 2017-08-14 PROCEDURE — 80061 LIPID PANEL: CPT | Performed by: FAMILY MEDICINE

## 2017-08-14 PROCEDURE — 36415 COLL VENOUS BLD VENIPUNCTURE: CPT | Performed by: FAMILY MEDICINE

## 2017-08-16 ENCOUNTER — TELEPHONE (OUTPATIENT)
Dept: FAMILY MEDICINE CLINIC | Facility: CLINIC | Age: 52
End: 2017-08-16

## 2017-08-16 DIAGNOSIS — E78.5 HYPERLIPIDEMIA, UNSPECIFIED HYPERLIPIDEMIA TYPE: Primary | ICD-10-CM

## 2017-10-30 DIAGNOSIS — E78.00 PURE HYPERCHOLESTEROLEMIA: ICD-10-CM

## 2017-10-30 DIAGNOSIS — I10 ESSENTIAL HYPERTENSION: ICD-10-CM

## 2017-10-31 RX ORDER — PRAVASTATIN SODIUM 10 MG
TABLET ORAL
Qty: 90 TABLET | Refills: 0 | Status: SHIPPED | OUTPATIENT
Start: 2017-10-31 | End: 2017-11-20

## 2017-10-31 RX ORDER — LISINOPRIL 10 MG/1
TABLET ORAL
Qty: 90 TABLET | Refills: 0 | Status: SHIPPED | OUTPATIENT
Start: 2017-10-31 | End: 2018-01-20

## 2017-10-31 NOTE — TELEPHONE ENCOUNTER
Please call pt. He was due for a follow up cholesterol and htn visit with Dr. Jai Walker in September.

## 2017-11-20 ENCOUNTER — OFFICE VISIT (OUTPATIENT)
Dept: FAMILY MEDICINE CLINIC | Facility: CLINIC | Age: 52
End: 2017-11-20

## 2017-11-20 VITALS
DIASTOLIC BLOOD PRESSURE: 82 MMHG | HEART RATE: 80 BPM | TEMPERATURE: 98 F | SYSTOLIC BLOOD PRESSURE: 122 MMHG | BODY MASS INDEX: 26.53 KG/M2 | RESPIRATION RATE: 16 BRPM | WEIGHT: 198 LBS | HEIGHT: 72.5 IN

## 2017-11-20 DIAGNOSIS — E78.00 PURE HYPERCHOLESTEROLEMIA: ICD-10-CM

## 2017-11-20 DIAGNOSIS — I10 HTN (HYPERTENSION), BENIGN: Primary | ICD-10-CM

## 2017-11-20 DIAGNOSIS — Z85.46 PERSONAL HISTORY OF PROSTATE CANCER: ICD-10-CM

## 2017-11-20 PROBLEM — K83.1 BILIARY OBSTRUCTION: Status: RESOLVED | Noted: 2017-05-21 | Resolved: 2017-11-20

## 2017-11-20 PROBLEM — K83.1 BILIARY OBSTRUCTION (HCC): Status: RESOLVED | Noted: 2017-05-21 | Resolved: 2017-11-20

## 2017-11-20 PROCEDURE — 99214 OFFICE O/P EST MOD 30 MIN: CPT | Performed by: FAMILY MEDICINE

## 2017-11-20 RX ORDER — PRAVASTATIN SODIUM 20 MG
20 TABLET ORAL NIGHTLY
Qty: 90 TABLET | Refills: 1 | Status: SHIPPED | OUTPATIENT
Start: 2017-11-20 | End: 2018-06-10

## 2017-11-20 NOTE — PROGRESS NOTES
Reynold Stoner is a 46year old male. HPI:   Patient presents for recheck of his hypertension. Pt has been taking medications as instructed, no medication side effects, home BP monitoring in the range of 534-606'N systolic and 84'M diastolic.  The patient TIME DAILY Disp: 90 tablet Rfl: 0   Sildenafil Citrate (VIAGRA) 25 MG Oral Tab Take 1 tablet by mouth daily as needed for Erectile Dysfunction.  Disp: 36 tablet Rfl: 3      Past Medical History:   Diagnosis Date   • Benign neoplasm of colon    • Closed frac EXAM: Left: pedal 2+, posterior tibial 2.   GI: good BS's,no masses, HSM or tenderness  EXTREMITIES: no cyanosis, clubbing or edema      ASSESSMENT AND PLAN:   Htn (hypertension), benign  (primary encounter diagnosis)  Pure hypercholesterolemia  Personal hi

## 2018-01-20 DIAGNOSIS — I10 ESSENTIAL HYPERTENSION: ICD-10-CM

## 2018-01-22 RX ORDER — LISINOPRIL 10 MG/1
TABLET ORAL
Qty: 90 TABLET | Refills: 0 | Status: SHIPPED | OUTPATIENT
Start: 2018-01-22 | End: 2018-06-10

## 2018-02-08 ENCOUNTER — APPOINTMENT (OUTPATIENT)
Dept: LAB | Age: 53
End: 2018-02-08
Attending: FAMILY MEDICINE
Payer: COMMERCIAL

## 2018-02-08 DIAGNOSIS — E78.5 HYPERLIPIDEMIA, UNSPECIFIED HYPERLIPIDEMIA TYPE: ICD-10-CM

## 2018-02-08 DIAGNOSIS — Z85.46 PERSONAL HISTORY OF PROSTATE CANCER: ICD-10-CM

## 2018-02-08 LAB
ALBUMIN SERPL-MCNC: 4 G/DL (ref 3.5–4.8)
ALP LIVER SERPL-CCNC: 52 U/L (ref 45–117)
ALT SERPL-CCNC: 45 U/L (ref 17–63)
AST SERPL-CCNC: 21 U/L (ref 15–41)
BILIRUB SERPL-MCNC: 0.8 MG/DL (ref 0.1–2)
BUN BLD-MCNC: 12 MG/DL (ref 8–20)
CALCIUM BLD-MCNC: 9.4 MG/DL (ref 8.3–10.3)
CHLORIDE: 104 MMOL/L (ref 101–111)
CHOLEST SMN-MCNC: 200 MG/DL (ref ?–200)
CO2: 28 MMOL/L (ref 22–32)
CREAT BLD-MCNC: 0.98 MG/DL (ref 0.7–1.3)
GLUCOSE BLD-MCNC: 96 MG/DL (ref 70–99)
HDLC SERPL-MCNC: 64 MG/DL (ref 45–?)
HDLC SERPL: 3.13 {RATIO} (ref ?–4.97)
LDLC SERPL CALC-MCNC: 122 MG/DL (ref ?–130)
M PROTEIN MFR SERPL ELPH: 7.6 G/DL (ref 6.1–8.3)
NONHDLC SERPL-MCNC: 136 MG/DL (ref ?–130)
POTASSIUM SERPL-SCNC: 4.6 MMOL/L (ref 3.6–5.1)
PSA SERPL-MCNC: <0.01 NG/ML (ref 0.01–4)
SODIUM SERPL-SCNC: 140 MMOL/L (ref 136–144)
TRIGL SERPL-MCNC: 72 MG/DL (ref ?–150)
VLDLC SERPL CALC-MCNC: 14 MG/DL (ref 5–40)

## 2018-02-08 PROCEDURE — 36415 COLL VENOUS BLD VENIPUNCTURE: CPT | Performed by: FAMILY MEDICINE

## 2018-02-08 PROCEDURE — 84153 ASSAY OF PSA TOTAL: CPT | Performed by: FAMILY MEDICINE

## 2018-02-08 PROCEDURE — 80061 LIPID PANEL: CPT | Performed by: FAMILY MEDICINE

## 2018-02-08 PROCEDURE — 80053 COMPREHEN METABOLIC PANEL: CPT | Performed by: FAMILY MEDICINE

## 2018-02-12 DIAGNOSIS — I10 HTN (HYPERTENSION), BENIGN: ICD-10-CM

## 2018-02-12 DIAGNOSIS — E78.5 HYPERLIPIDEMIA, UNSPECIFIED HYPERLIPIDEMIA TYPE: Primary | ICD-10-CM

## 2018-06-06 ENCOUNTER — APPOINTMENT (OUTPATIENT)
Dept: LAB | Age: 53
End: 2018-06-06
Attending: FAMILY MEDICINE
Payer: COMMERCIAL

## 2018-06-06 DIAGNOSIS — E78.5 HYPERLIPIDEMIA, UNSPECIFIED HYPERLIPIDEMIA TYPE: ICD-10-CM

## 2018-06-06 DIAGNOSIS — I10 HTN (HYPERTENSION), BENIGN: ICD-10-CM

## 2018-06-06 PROCEDURE — 80061 LIPID PANEL: CPT | Performed by: FAMILY MEDICINE

## 2018-06-06 PROCEDURE — 80053 COMPREHEN METABOLIC PANEL: CPT | Performed by: FAMILY MEDICINE

## 2018-06-06 PROCEDURE — 36415 COLL VENOUS BLD VENIPUNCTURE: CPT | Performed by: FAMILY MEDICINE

## 2018-06-10 DIAGNOSIS — I10 ESSENTIAL HYPERTENSION: ICD-10-CM

## 2018-06-10 DIAGNOSIS — E78.00 PURE HYPERCHOLESTEROLEMIA: ICD-10-CM

## 2018-06-11 RX ORDER — PRAVASTATIN SODIUM 20 MG
TABLET ORAL
Qty: 90 TABLET | Refills: 0 | Status: SHIPPED | OUTPATIENT
Start: 2018-06-11 | End: 2018-09-22

## 2018-06-11 RX ORDER — LISINOPRIL 10 MG/1
TABLET ORAL
Qty: 90 TABLET | Refills: 0 | Status: SHIPPED | OUTPATIENT
Start: 2018-06-11 | End: 2018-09-22

## 2018-07-30 ENCOUNTER — OFFICE VISIT (OUTPATIENT)
Dept: FAMILY MEDICINE CLINIC | Facility: CLINIC | Age: 53
End: 2018-07-30
Payer: COMMERCIAL

## 2018-07-30 VITALS
DIASTOLIC BLOOD PRESSURE: 72 MMHG | RESPIRATION RATE: 16 BRPM | HEIGHT: 72.5 IN | TEMPERATURE: 98 F | SYSTOLIC BLOOD PRESSURE: 122 MMHG | HEART RATE: 68 BPM | WEIGHT: 189 LBS | BODY MASS INDEX: 25.32 KG/M2

## 2018-07-30 DIAGNOSIS — Z00.00 ANNUAL PHYSICAL EXAM: Primary | ICD-10-CM

## 2018-07-30 DIAGNOSIS — E78.2 MIXED HYPERLIPIDEMIA: ICD-10-CM

## 2018-07-30 DIAGNOSIS — I10 BENIGN ESSENTIAL HYPERTENSION: ICD-10-CM

## 2018-07-30 DIAGNOSIS — Z85.46 PERSONAL HISTORY OF PROSTATE CANCER: ICD-10-CM

## 2018-07-30 PROCEDURE — 99396 PREV VISIT EST AGE 40-64: CPT | Performed by: FAMILY MEDICINE

## 2018-07-30 NOTE — PROGRESS NOTES
Hilton Warren is a 46year old male who presents for a complete physical exam.   HPI:   Pt has been keeping his HTN under good control. No chest pain or SOB. No sxs related to his prostate ca and removal. Specifically, no incontinence.   He has not seen his Take 1 tablet by mouth daily as needed for Erectile Dysfunction.  Disp: 36 tablet Rfl: 3       PAST MEDICAL HISTORY:     Past Medical History:   Diagnosis Date   • Benign neoplasm of colon    • Closed fracture of unspecified part of tibia    • Essential hyp heartburn  : denies nocturia or changes in stream  MUSCULOSKELETAL: denies back pain  NEURO: denies headaches  PSYCHE: denies depression or anxiety  HEMATOLOGIC: denies hx of anemia  ENDOCRINE: denies thyroid history  ALL/ASTHMA: denies hx of allergy or

## 2018-09-22 DIAGNOSIS — I10 ESSENTIAL HYPERTENSION: ICD-10-CM

## 2018-09-22 DIAGNOSIS — E78.00 PURE HYPERCHOLESTEROLEMIA: ICD-10-CM

## 2018-09-25 RX ORDER — PRAVASTATIN SODIUM 20 MG
TABLET ORAL
Qty: 90 TABLET | Refills: 0 | Status: SHIPPED | OUTPATIENT
Start: 2018-09-25 | End: 2018-09-27

## 2018-09-25 RX ORDER — LISINOPRIL 10 MG/1
TABLET ORAL
Qty: 90 TABLET | Refills: 0 | Status: SHIPPED | OUTPATIENT
Start: 2018-09-25 | End: 2018-09-27

## 2018-09-27 DIAGNOSIS — I10 ESSENTIAL HYPERTENSION: ICD-10-CM

## 2018-09-27 DIAGNOSIS — E78.00 PURE HYPERCHOLESTEROLEMIA: ICD-10-CM

## 2018-09-27 RX ORDER — LISINOPRIL 10 MG/1
TABLET ORAL
Qty: 90 TABLET | Refills: 0 | Status: SHIPPED | OUTPATIENT
Start: 2018-09-27 | End: 2018-12-09

## 2018-09-27 RX ORDER — PRAVASTATIN SODIUM 20 MG
TABLET ORAL
Qty: 90 TABLET | Refills: 0 | Status: SHIPPED | OUTPATIENT
Start: 2018-09-27 | End: 2018-12-09

## 2018-09-27 NOTE — TELEPHONE ENCOUNTER
Second request from [facility] for refills on Lisinopril and for pravastatin. They did not receive the refills sent on 09/25/18 so I am sending again.

## 2018-12-09 DIAGNOSIS — E78.00 PURE HYPERCHOLESTEROLEMIA: ICD-10-CM

## 2018-12-09 DIAGNOSIS — I10 ESSENTIAL HYPERTENSION: ICD-10-CM

## 2018-12-10 DIAGNOSIS — E78.00 PURE HYPERCHOLESTEROLEMIA: ICD-10-CM

## 2018-12-10 DIAGNOSIS — I10 ESSENTIAL HYPERTENSION: ICD-10-CM

## 2018-12-10 RX ORDER — PRAVASTATIN SODIUM 20 MG
TABLET ORAL
Qty: 90 TABLET | Refills: 3 | OUTPATIENT
Start: 2018-12-10

## 2018-12-10 RX ORDER — LISINOPRIL 10 MG/1
TABLET ORAL
Qty: 90 TABLET | Refills: 0 | Status: SHIPPED | OUTPATIENT
Start: 2018-12-10 | End: 2018-12-10

## 2018-12-10 RX ORDER — PRAVASTATIN SODIUM 20 MG
TABLET ORAL
Qty: 90 TABLET | Refills: 0 | Status: SHIPPED | OUTPATIENT
Start: 2018-12-10 | End: 2019-03-01

## 2018-12-10 RX ORDER — LISINOPRIL 10 MG/1
TABLET ORAL
Qty: 90 TABLET | Refills: 3 | Status: SHIPPED | OUTPATIENT
Start: 2018-12-10 | End: 2019-12-20

## 2019-02-14 ENCOUNTER — HOSPITAL ENCOUNTER (OUTPATIENT)
Dept: CT IMAGING | Facility: HOSPITAL | Age: 54
Discharge: HOME | End: 2019-02-14
Attending: FAMILY MEDICINE

## 2019-02-14 DIAGNOSIS — Z13.6 SCREENING FOR CARDIOVASCULAR CONDITION: ICD-10-CM

## 2019-03-01 DIAGNOSIS — E78.00 PURE HYPERCHOLESTEROLEMIA: ICD-10-CM

## 2019-03-01 RX ORDER — PRAVASTATIN SODIUM 20 MG
20 TABLET ORAL NIGHTLY
Qty: 90 TABLET | Refills: 0 | Status: SHIPPED | OUTPATIENT
Start: 2019-03-01 | End: 2020-01-14

## 2019-03-01 NOTE — TELEPHONE ENCOUNTER
Refill request received from [facility]. Pt is requesting a refill for Pravastatin 20 mg daily.  Pt has an appt in March

## 2019-03-29 ENCOUNTER — OFFICE VISIT (OUTPATIENT)
Dept: FAMILY MEDICINE CLINIC | Facility: CLINIC | Age: 54
End: 2019-03-29
Payer: COMMERCIAL

## 2019-03-29 ENCOUNTER — LAB ENCOUNTER (OUTPATIENT)
Dept: LAB | Age: 54
End: 2019-03-29
Attending: FAMILY MEDICINE
Payer: COMMERCIAL

## 2019-03-29 VITALS
HEIGHT: 72.25 IN | SYSTOLIC BLOOD PRESSURE: 122 MMHG | RESPIRATION RATE: 16 BRPM | HEART RATE: 72 BPM | DIASTOLIC BLOOD PRESSURE: 76 MMHG | BODY MASS INDEX: 24.38 KG/M2 | WEIGHT: 182 LBS | TEMPERATURE: 97 F

## 2019-03-29 DIAGNOSIS — I10 ESSENTIAL HYPERTENSION: Primary | ICD-10-CM

## 2019-03-29 DIAGNOSIS — I10 BENIGN ESSENTIAL HYPERTENSION: ICD-10-CM

## 2019-03-29 DIAGNOSIS — Z85.46 PERSONAL HISTORY OF PROSTATE CANCER: ICD-10-CM

## 2019-03-29 DIAGNOSIS — E78.00 PURE HYPERCHOLESTEROLEMIA: ICD-10-CM

## 2019-03-29 DIAGNOSIS — E78.2 MIXED HYPERLIPIDEMIA: ICD-10-CM

## 2019-03-29 PROBLEM — D37.5 NEOPLASM OF UNCERTAIN BEHAVIOR OF STOMACH, INTESTINES, AND RECTUM: Status: ACTIVE | Noted: 2019-03-29

## 2019-03-29 PROBLEM — D37.1 NEOPLASM OF UNCERTAIN BEHAVIOR OF STOMACH, INTESTINES, AND RECTUM: Status: ACTIVE | Noted: 2019-03-29

## 2019-03-29 PROBLEM — K57.30 DIVERTICULOSIS OF COLON: Status: ACTIVE | Noted: 2019-03-29

## 2019-03-29 PROBLEM — D37.8 NEOPLASM OF UNCERTAIN BEHAVIOR OF STOMACH, INTESTINES, AND RECTUM: Status: ACTIVE | Noted: 2019-03-29

## 2019-03-29 PROBLEM — Z80.0 FAMILY HISTORY OF MALIGNANT NEOPLASM OF GASTROINTESTINAL TRACT: Status: ACTIVE | Noted: 2019-03-29

## 2019-03-29 LAB
ALBUMIN SERPL-MCNC: 4.4 G/DL (ref 3.4–5)
ALBUMIN/GLOB SERPL: 1.4 {RATIO} (ref 1–2)
ALP LIVER SERPL-CCNC: 48 U/L (ref 45–117)
ALT SERPL-CCNC: 50 U/L (ref 16–61)
ANION GAP SERPL CALC-SCNC: 6 MMOL/L (ref 0–18)
AST SERPL-CCNC: 25 U/L (ref 15–37)
BASOPHILS # BLD AUTO: 0.04 X10(3) UL (ref 0–0.2)
BASOPHILS NFR BLD AUTO: 0.8 %
BILIRUB SERPL-MCNC: 0.9 MG/DL (ref 0.1–2)
BUN BLD-MCNC: 12 MG/DL (ref 7–18)
BUN/CREAT SERPL: 12.8 (ref 10–20)
CALCIUM BLD-MCNC: 8.9 MG/DL (ref 8.5–10.1)
CHLORIDE SERPL-SCNC: 106 MMOL/L (ref 98–107)
CHOLEST SMN-MCNC: 186 MG/DL (ref ?–200)
CO2 SERPL-SCNC: 28 MMOL/L (ref 21–32)
CREAT BLD-MCNC: 0.94 MG/DL (ref 0.7–1.3)
DEPRECATED RDW RBC AUTO: 41.2 FL (ref 35.1–46.3)
EOSINOPHIL # BLD AUTO: 0.09 X10(3) UL (ref 0–0.7)
EOSINOPHIL NFR BLD AUTO: 1.8 %
ERYTHROCYTE [DISTWIDTH] IN BLOOD BY AUTOMATED COUNT: 12.3 % (ref 11–15)
GLOBULIN PLAS-MCNC: 3.1 G/DL (ref 2.8–4.4)
GLUCOSE BLD-MCNC: 91 MG/DL (ref 70–99)
HCT VFR BLD AUTO: 46 % (ref 39–53)
HDLC SERPL-MCNC: 64 MG/DL (ref 40–59)
HGB BLD-MCNC: 16.3 G/DL (ref 13–17.5)
IMM GRANULOCYTES # BLD AUTO: 0.01 X10(3) UL (ref 0–1)
IMM GRANULOCYTES NFR BLD: 0.2 %
LDLC SERPL CALC-MCNC: 113 MG/DL (ref ?–100)
LYMPHOCYTES # BLD AUTO: 2.04 X10(3) UL (ref 1–4)
LYMPHOCYTES NFR BLD AUTO: 40.8 %
M PROTEIN MFR SERPL ELPH: 7.5 G/DL (ref 6.4–8.2)
MCH RBC QN AUTO: 32.7 PG (ref 26–34)
MCHC RBC AUTO-ENTMCNC: 35.4 G/DL (ref 31–37)
MCV RBC AUTO: 92.2 FL (ref 80–100)
MONOCYTES # BLD AUTO: 0.52 X10(3) UL (ref 0.1–1)
MONOCYTES NFR BLD AUTO: 10.4 %
NEUTROPHILS # BLD AUTO: 2.3 X10 (3) UL (ref 1.5–7.7)
NEUTROPHILS # BLD AUTO: 2.3 X10(3) UL (ref 1.5–7.7)
NEUTROPHILS NFR BLD AUTO: 46 %
NONHDLC SERPL-MCNC: 122 MG/DL (ref ?–130)
OSMOLALITY SERPL CALC.SUM OF ELEC: 289 MOSM/KG (ref 275–295)
PLATELET # BLD AUTO: 210 10(3)UL (ref 150–450)
POTASSIUM SERPL-SCNC: 4.2 MMOL/L (ref 3.5–5.1)
PSA SERPL-MCNC: <0.01 NG/ML (ref ?–4)
RBC # BLD AUTO: 4.99 X10(6)UL (ref 4.3–5.7)
SODIUM SERPL-SCNC: 140 MMOL/L (ref 136–145)
TRIGL SERPL-MCNC: 45 MG/DL (ref 30–149)
VLDLC SERPL CALC-MCNC: 9 MG/DL (ref 0–30)
WBC # BLD AUTO: 5 X10(3) UL (ref 4–11)

## 2019-03-29 PROCEDURE — 85025 COMPLETE CBC W/AUTO DIFF WBC: CPT | Performed by: FAMILY MEDICINE

## 2019-03-29 PROCEDURE — 84153 ASSAY OF PSA TOTAL: CPT | Performed by: FAMILY MEDICINE

## 2019-03-29 PROCEDURE — 99214 OFFICE O/P EST MOD 30 MIN: CPT | Performed by: FAMILY MEDICINE

## 2019-03-29 PROCEDURE — 36415 COLL VENOUS BLD VENIPUNCTURE: CPT | Performed by: FAMILY MEDICINE

## 2019-03-29 PROCEDURE — 80053 COMPREHEN METABOLIC PANEL: CPT | Performed by: FAMILY MEDICINE

## 2019-03-29 PROCEDURE — 80061 LIPID PANEL: CPT | Performed by: FAMILY MEDICINE

## 2019-03-29 NOTE — PROGRESS NOTES
Blanca Cortes is a 48year old male. HPI:   Patient presents for recheck of his hypertension. Pt has been taking medications as instructed, no medication side effects, home BP monitoring in the range of 529-477'H systolic and 15'J diastolic.  The patient 36 tablet Rfl: 3      Past Medical History:   Diagnosis Date   • Benign neoplasm of colon    • Closed fracture of unspecified part of tibia    • Essential hypertension    • HIGH BLOOD PRESSURE    • HIGH CHOLESTEROL    • Hyperlipidemia    • Personal history distress  SKIN: no rashes,no suspicious lesions  HEENT: atraumatic, normocephalic,ears and throat are clear  NECK: supple,no adenopathy,no bruits  LUNGS: clear to auscultation  CARDIO: RRR without murmur    PULSE EXAM: Right: pedal 2+, posterior tibial 2.

## 2019-04-01 DIAGNOSIS — E78.2 MIXED HYPERLIPIDEMIA: Primary | ICD-10-CM

## 2019-05-28 ENCOUNTER — TELEPHONE (OUTPATIENT)
Dept: FAMILY MEDICINE CLINIC | Facility: CLINIC | Age: 54
End: 2019-05-28

## 2019-05-28 DIAGNOSIS — R19.5 LIGHT STOOLS: Primary | ICD-10-CM

## 2019-05-28 NOTE — TELEPHONE ENCOUNTER
Patient states he is on Nystatin and that the last 2 weeks he has had light colored stool. He is wondering if he should be concerned or not? Please advise. Thank you.

## 2019-05-28 NOTE — TELEPHONE ENCOUNTER
Call to pt-sts he did not call about nystatin. sts called stating he has been on a statin for quite a while-record shows pravastatin since 11/2017.   Reports noticed light brown/tan/yellow colored stools intermittently for past 3-4 wks and now regularly for

## 2019-05-28 NOTE — TELEPHONE ENCOUNTER
Call to pt with dr Manan Watt recommendations. Pt confirms no change in color of urine. Informed edward lab orders placed. He sts can't get to lab today, will come in tomorrow morning. Patient voices understanding/agrees with plan/no further questions.

## 2019-05-29 ENCOUNTER — LAB ENCOUNTER (OUTPATIENT)
Dept: LAB | Age: 54
End: 2019-05-29
Attending: FAMILY MEDICINE
Payer: COMMERCIAL

## 2019-05-29 DIAGNOSIS — R19.5 LIGHT STOOLS: ICD-10-CM

## 2019-05-29 PROCEDURE — 36415 COLL VENOUS BLD VENIPUNCTURE: CPT | Performed by: FAMILY MEDICINE

## 2019-05-29 PROCEDURE — 85025 COMPLETE CBC W/AUTO DIFF WBC: CPT | Performed by: FAMILY MEDICINE

## 2019-05-29 PROCEDURE — 80053 COMPREHEN METABOLIC PANEL: CPT | Performed by: FAMILY MEDICINE

## 2019-08-13 NOTE — DISCHARGE SUMMARY
[facility]  DISCHARGE SUMMARY     Rose Gonsalez Patient Status:  Inpatient    10/9/1965 MRN [id number]   [facility] 3SW-A Attending Steve Samayoa MD   Hosp Day # 5 PCP Viktoriya Shaw MD     Date of Admission: 2017  Date of D shortness of breath, lightheadedness or dizziness.  He denies leg swelling or calf pain. Brief Synopsis:     Patient admitted and underwent lap cholecystectomy. Post Lap tee he required ERCP with stone extraction per GI. His LFTs improved.  Abdominal rhonchi. Cardiovascular: S1, S2. Regular rate and rhythm. No murmurs, rubs or gallops. Abdomen: Soft, nontender, nondistended. Positive bowel sounds. No rebound or guarding. Neurologic: No focal neurological deficits.    Musculoskeletal: Moves all extr Unknown

## 2019-11-26 ENCOUNTER — MED REC SCAN ONLY (OUTPATIENT)
Dept: FAMILY MEDICINE CLINIC | Facility: CLINIC | Age: 54
End: 2019-11-26

## 2019-12-20 DIAGNOSIS — I10 ESSENTIAL HYPERTENSION: ICD-10-CM

## 2019-12-20 RX ORDER — LISINOPRIL 10 MG/1
TABLET ORAL
Qty: 90 TABLET | Refills: 0 | Status: SHIPPED | OUTPATIENT
Start: 2019-12-20 | End: 2020-02-18

## 2020-01-13 ENCOUNTER — LAB ENCOUNTER (OUTPATIENT)
Dept: LAB | Age: 55
End: 2020-01-13
Attending: FAMILY MEDICINE
Payer: COMMERCIAL

## 2020-01-13 DIAGNOSIS — E78.2 MIXED HYPERLIPIDEMIA: Primary | ICD-10-CM

## 2020-01-13 LAB
ALBUMIN SERPL-MCNC: 3.8 G/DL (ref 3.4–5)
ALBUMIN/GLOB SERPL: 1.1 {RATIO} (ref 1–2)
ALP LIVER SERPL-CCNC: 54 U/L (ref 45–117)
ALT SERPL-CCNC: 31 U/L (ref 16–61)
ANION GAP SERPL CALC-SCNC: 6 MMOL/L (ref 0–18)
AST SERPL-CCNC: 17 U/L (ref 15–37)
BILIRUB SERPL-MCNC: 0.6 MG/DL (ref 0.1–2)
BUN BLD-MCNC: 12 MG/DL (ref 7–18)
BUN/CREAT SERPL: 11.7 (ref 10–20)
CALCIUM BLD-MCNC: 8.6 MG/DL (ref 8.5–10.1)
CHLORIDE SERPL-SCNC: 108 MMOL/L (ref 98–112)
CHOLEST SMN-MCNC: 230 MG/DL (ref ?–200)
CO2 SERPL-SCNC: 27 MMOL/L (ref 21–32)
CREAT BLD-MCNC: 1.03 MG/DL (ref 0.7–1.3)
GLOBULIN PLAS-MCNC: 3.4 G/DL (ref 2.8–4.4)
GLUCOSE BLD-MCNC: 104 MG/DL (ref 70–99)
HDLC SERPL-MCNC: 53 MG/DL (ref 40–59)
LDLC SERPL CALC-MCNC: 160 MG/DL (ref ?–100)
M PROTEIN MFR SERPL ELPH: 7.2 G/DL (ref 6.4–8.2)
NONHDLC SERPL-MCNC: 177 MG/DL (ref ?–130)
OSMOLALITY SERPL CALC.SUM OF ELEC: 292 MOSM/KG (ref 275–295)
PATIENT FASTING Y/N/NP: YES
POTASSIUM SERPL-SCNC: 4.3 MMOL/L (ref 3.5–5.1)
SODIUM SERPL-SCNC: 141 MMOL/L (ref 136–145)
TRIGL SERPL-MCNC: 83 MG/DL (ref 30–149)
VLDLC SERPL CALC-MCNC: 17 MG/DL (ref 0–30)

## 2020-01-13 PROCEDURE — 80061 LIPID PANEL: CPT | Performed by: FAMILY MEDICINE

## 2020-01-13 PROCEDURE — 36415 COLL VENOUS BLD VENIPUNCTURE: CPT | Performed by: FAMILY MEDICINE

## 2020-01-13 PROCEDURE — 80053 COMPREHEN METABOLIC PANEL: CPT | Performed by: FAMILY MEDICINE

## 2020-01-14 ENCOUNTER — OFFICE VISIT (OUTPATIENT)
Dept: FAMILY MEDICINE CLINIC | Facility: CLINIC | Age: 55
End: 2020-01-14
Payer: COMMERCIAL

## 2020-01-14 VITALS
DIASTOLIC BLOOD PRESSURE: 72 MMHG | HEIGHT: 72 IN | WEIGHT: 189 LBS | SYSTOLIC BLOOD PRESSURE: 112 MMHG | HEART RATE: 66 BPM | BODY MASS INDEX: 25.6 KG/M2 | RESPIRATION RATE: 16 BRPM

## 2020-01-14 DIAGNOSIS — E78.00 PURE HYPERCHOLESTEROLEMIA: ICD-10-CM

## 2020-01-14 DIAGNOSIS — Z85.46 PERSONAL HISTORY OF PROSTATE CANCER: ICD-10-CM

## 2020-01-14 DIAGNOSIS — Z00.00 ANNUAL PHYSICAL EXAM: Primary | ICD-10-CM

## 2020-01-14 DIAGNOSIS — I10 BENIGN ESSENTIAL HYPERTENSION: ICD-10-CM

## 2020-01-14 PROCEDURE — 99396 PREV VISIT EST AGE 40-64: CPT | Performed by: FAMILY MEDICINE

## 2020-01-14 NOTE — PROGRESS NOTES
Miles Braga is a 47year old male who presents for a complete physical exam.   HPI:   Pt has been keeping his HTN under good control. No chest pain or SOB. No sxs related to his prostate ca and removal. Specifically, no incontinence.   He has not seen hi for Erectile Dysfunction.  36 tablet 3       PAST MEDICAL HISTORY:     Past Medical History:   Diagnosis Date   • Benign neoplasm of colon    • Closed fracture of unspecified part of tibia    • Essential hypertension    • HIGH BLOOD PRESSURE    • HIGH NONA Exercise: three times per week.   Diet: watches calories closely    REVIEW OF SYSTEMS:   GENERAL: feels well otherwise  SKIN: denies any unusual skin lesions  EYES:denies blurred vision or double vision  HEENT: denies nasal congestion, sinus pain or ST  L Consults:  None     Patient return for a blood pressure check in 6 months after above labs  The patient is asked to return for CPX in 1 year

## 2020-01-14 NOTE — PROGRESS NOTES
Radha Malik is a 47year old male who presents for a complete physical exam.   HPI:   Pt has been keeping his HTN under good control. No chest pain or SOB. No sxs related to his prostate ca and removal. Specifically, no incontinence.   He has not seen hi Calcium (LIVALO) 2 MG Oral Tab Take 1 tablet by mouth nightly for 90 doses. 90 tablet 1   • lisinopril 10 MG Oral Tab Take one daily 90 tablet 0   • Sildenafil Citrate (VIAGRA) 25 MG Oral Tab Take 1 tablet by mouth daily as needed for Erectile Dysfunction. tobacco: Never Used    Alcohol use: Yes      Alcohol/week: 15.0 standard drinks      Types: 15 Standard drinks or equivalent per week      Comment: 2 drinks a day     Drug use: No     Occ: . : Y. Children: 3.    Exercise: three times per diagnosis)  Pure hypercholesterolemia  Benign essential hypertension  Personal history of prostate cancer    Orders Placed This Encounter      Comp Metabolic Panel (14)      Lipid Panel      Meds & Refills for this Visit:  Requested Prescriptions     Sonia

## 2020-01-16 ENCOUNTER — TELEPHONE (OUTPATIENT)
Dept: FAMILY MEDICINE CLINIC | Facility: CLINIC | Age: 55
End: 2020-01-16

## 2020-01-16 DIAGNOSIS — I10 ESSENTIAL HYPERTENSION: ICD-10-CM

## 2020-01-16 DIAGNOSIS — E78.00 PURE HYPERCHOLESTEROLEMIA: ICD-10-CM

## 2020-02-06 NOTE — TELEPHONE ENCOUNTER
Called to [employer], spoke with Ivelisse White who stated that pt's coverage  2019. Called to pt who stated that his insurance did change, but he does not have the details yet.   Requested that pt contact our office with the new

## 2020-02-11 ENCOUNTER — TELEPHONE (OUTPATIENT)
Dept: FAMILY MEDICINE CLINIC | Facility: CLINIC | Age: 55
End: 2020-02-11

## 2020-02-18 RX ORDER — LISINOPRIL 10 MG/1
TABLET ORAL
Qty: 90 TABLET | Refills: 0 | Status: SHIPPED | OUTPATIENT
Start: 2020-02-18 | End: 2020-03-10

## 2020-02-18 NOTE — TELEPHONE ENCOUNTER
Pt returned call and stated that his new insurance on file is [address]   I asked if pt wanted to have his medication resent to the mail order pharmacy or did he want it sent to the local.  Pt stated that local would be fine, he also asked for a refill

## 2020-02-18 NOTE — TELEPHONE ENCOUNTER
Request for a PA to be completed for pt's [place] received. PA form and documentation completed and faxed to plan.

## 2020-03-10 DIAGNOSIS — I10 ESSENTIAL HYPERTENSION: ICD-10-CM

## 2020-03-10 RX ORDER — LISINOPRIL 10 MG/1
TABLET ORAL
Qty: 90 TABLET | Refills: 1 | Status: SHIPPED | OUTPATIENT
Start: 2020-03-10 | End: 2020-11-17

## 2020-11-16 DIAGNOSIS — I10 ESSENTIAL HYPERTENSION: ICD-10-CM

## 2020-11-16 NOTE — TELEPHONE ENCOUNTER
Fax from [facility] for lisinpril    Please call pt to schedule med check with Dr. Franco Narvaez. Thank you.

## 2020-11-17 RX ORDER — LISINOPRIL 10 MG/1
TABLET ORAL
Qty: 90 TABLET | Refills: 1 | Status: SHIPPED | OUTPATIENT
Start: 2020-11-17

## 2020-11-17 NOTE — TELEPHONE ENCOUNTER
Patient states he lives in [place] and due to [employer] he has not been able to get in to see a new Physician. Patient is asking for this refill until he is able to do so.

## 2025-08-30 ENCOUNTER — PATIENT OUTREACH (OUTPATIENT)
Dept: CASE MANAGEMENT | Age: 60
End: 2025-08-30

## (undated) DEVICE — Device

## (undated) DEVICE — DRAPE C-ARM UNIVERSAL

## (undated) DEVICE — SYRINGE 10ML LL TIP

## (undated) DEVICE — GLOVE SURG SENSICARE SZ 7

## (undated) DEVICE — KENDALL SCD EXPRESS SLEEVES, KNEE LENGTH, MEDIUM: Brand: KENDALL SCD

## (undated) DEVICE — FILTERLINE NASAL ADULT O2/CO2

## (undated) DEVICE — VIOLET BRAIDED (POLYGLACTIN 910), SYNTHETIC ABSORBABLE SUTURE: Brand: COATED VICRYL

## (undated) DEVICE — ENDOPATH XCEL BLUNT TIP TROCARS WITH SMOOTH SLEEVES: Brand: ENDOPATH XCEL

## (undated) DEVICE — SYRINGE 50ML LL TIP

## (undated) DEVICE — LOCKING DEVICE RX & BIOPSY CAP

## (undated) DEVICE — 3M™ RED DOT™ MONITORING ELECTRODE WITH FOAM TAPE AND STICKY GEL, 50/BAG, 20/CASE, 72/PLT 2570: Brand: RED DOT™

## (undated) DEVICE — ENDOPATH XCEL UNIVERSAL TROCAR STABLILITY SLEEVES: Brand: ENDOPATH XCEL

## (undated) DEVICE — OPEN-END FLEXI-TIP URETERAL CATHETER: Brand: FLEXI-TIP

## (undated) DEVICE — CAUTERY PENCIL

## (undated) DEVICE — REM POLYHESIVE ADULT PATIENT RETURN ELECTRODE: Brand: VALLEYLAB

## (undated) DEVICE — TRUETOME 39 3.9 FX 20CM

## (undated) DEVICE — GLOVE BIOGEL M SURG SZ 71/2

## (undated) DEVICE — BOWLS UTILITY 16OZ

## (undated) DEVICE — CHLORAPREP 26ML APPLICATOR

## (undated) DEVICE — ENDOPATH XCEL DILATING TIP TROCARS WITH STABILITY SLEEVES: Brand: ENDOPATH XCEL

## (undated) DEVICE — JAGWIRE STRGHT TIP .025X450CM

## (undated) DEVICE — ENDO POUCH

## (undated) DEVICE — LAP CHOLE/APPY CDS-LF: Brand: MEDLINE INDUSTRIES, INC.

## (undated) DEVICE — Device: Brand: DEFENDO AIR/WATER/SUCTION AND BIOPSY VALVE

## (undated) DEVICE — MEDI-VAC NON-CONDUCTIVE SUCTION TUBING: Brand: CARDINAL HEALTH

## (undated) DEVICE — SUTURE MONOCRYL 4-0 PS-2

## (undated) DEVICE — ENDOSCOPY PACK UPPER: Brand: MEDLINE INDUSTRIES, INC.

## (undated) DEVICE — ZIPWIRE GUIDEWIRE .035X150 STR

## (undated) DEVICE — LIGAMAX 5 MM ENDOSCOPIC MULTIPLE CLIP APPLIER: Brand: LIGAMAX

## (undated) DEVICE — RETRIEVAL BALLOON CATHETER: Brand: EXTRACTOR™ PRO RX

## (undated) NOTE — MR AVS SNAPSHOT
[facility] 37, [address] [address] [phone]               Thank you for choosing us for your health care visit with Keysha Patel MD.  We are glad to serve you and happy to provide you with this cabrera If you've recently had a stay at the Hospital you can access your discharge instructions in [facility] by going to Visits < Admission Summaries.  If you've been to the Emergency Department or your doctor's office, you can view your past visit information in My Visit [facility] online at  [email or web address]

## (undated) NOTE — MR AVS SNAPSHOT
[facility]  [address] [address] [phone]               Thank you for choosing us for your health care visit with Adama Nicole DO.   We are glad to serve you and happy to provide you with this summary of yo For medical emergencies, dial 911.            Visit [facility] online at  [email or web address]

## (undated) NOTE — LETTER
[facility]  Kal My 61 [address] [facility], [address] [place] [address]    Consent for Operation    Date: __________________    Time: _______________    1.  I authorize the performance upon Alejandro Horner the following operation:        Laparoscopic possible open Cholecys procedure has been videotaped, the surgeon will obtain the original videotape. The hospital will not be responsible for storage or maintenance of this tape.     6. For the purpose of advancing medical education, I consent to the admittance of observers to t STATEMENTS REQUIRING INSERTION OR COMPLETION WERE FILLED IN.     Signature of Patient:   ___________________________    When the patient is a minor or mentally incompetent to give consent:  Signature of person authorized to consent for patient: ____________ drugs/illegal medications). Failure to inform my anesthesiologist about these medicines may increase my risk of anesthetic complications. · If I am allergic to anything or have had a reaction to anesthesia before.     3. I understand how the anesthesia med I have discussed the procedure and information above with the patient (or patient’s representative) and answered their questions. The patient or their representative has agreed to have anesthesia services.     _______________________________________________

## (undated) NOTE — IP AVS SNAPSHOT
[facility] [place] [address] [place], [address] ~ [phone]                Discharge Summary   5/21/2017    Sam Chen           Admission Information        Provider Department    5/21/2017 Norma Resendiz MD  3sw-A         Thank Mart Siegel                                Where to Get Your Medications      Please  your prescriptions at the location directed by your doctor or nurse     Bring a paper prescription for each of these medications    - HYDROcodone-acetamino gauze, tape and band-aids if they are present. Do not remove the steri-strips or butterfly tapes that are white and adherent to the skin. The steri-strips will eventually peel up at the ends and at this point they may be removed.   This is usually seven t may go up and down stairs and lift up to five pounds but no bending, pushing or pulling. Nothing called work or exercise until the follow up visit. No ‘stair-master’, power walking, jogging or workouts until the follow up visit.   Patients should seek fur 45.0 (04/25/17)  94.5    (04/25/17)  202.0       Recent Hematology Lab Results (cont.)  (Last 3 results in the past 90 days)    Neutrophil % Lymphocyte % Monocyte % Eosinophil % Basophil % Prelim Neut Abs Final Neut Abs Lymphocyte Abso Monocyte Absolu Eosi can help with your Affordable Care Act coverage, as well as all types of Medicaid plans. To get signed up and covered, please call [phone] and ask to get set up for an insurance coverage that is in-network with [facility] Cholesterol Lowering Medications     Pravastatin Sodium 10 MG Oral Tab       Use: Lower cholesterol, protect your heart   Most common side effects: Dizziness, constipation, abnormal liver function   What to report to your healthcare team:  Dizziness, muscl

## (undated) NOTE — MR AVS SNAPSHOT
[facility] 37, [address] [address] [phone]               Thank you for choosing us for your health care visit with Rob Aaron MD.  We are glad to serve you and happy to provide you with this cabrera Visit ON24  You can access your MyChart to more actively manage your health care and view more details from this visit by going to [email or web address].   If you've recently had a stay at the Hospital you can access your discharge instructions i

## (undated) NOTE — Clinical Note
Pt does not have HFU appt scheduled at this time. He states he is waiting for a call back from triage regarding when he can be seen. Thank you!

## (undated) NOTE — LETTER
[facility]  Michael Leonardaflavio 61 0401 [facility], [address] [place] [address]    Consent for Operation    Date: __________________    Time: _______________    1.  I authorize the performance upon Alejandro Horner the following operation:    Procedure(s):  ENDOSCOPIC ULTRASOUND PO procedure has been videotaped, the surgeon will obtain the original videotape. The hospital will not be responsible for storage or maintenance of this tape.     6. For the purpose of advancing medical education, I consent to the admittance of observers to t STATEMENTS REQUIRING INSERTION OR COMPLETION WERE FILLED IN.     Signature of Patient:   ___________________________    When the patient is a minor or mentally incompetent to give consent:  Signature of person authorized to consent for patient: ____________ drugs/illegal medications). Failure to inform my anesthesiologist about these medicines may increase my risk of anesthetic complications. · If I am allergic to anything or have had a reaction to anesthesia before.     3. I understand how the anesthesia med I have discussed the procedure and information above with the patient (or patient’s representative) and answered their questions. The patient or their representative has agreed to have anesthesia services.     _______________________________________________

## (undated) NOTE — MR AVS SNAPSHOT
[facility] [address] [address] [phone]               Thank you for choosing us for your health care visit with Jackelyn Keene MD.  We are glad to serve you and happy to provide you with this cabrera Take 1 tablet by mouth daily as needed for Erectile Dysfunction.    Commonly known as:  VIAGRA                Where to Get Your Medications      These medications were sent to [address] [place] [address], [address], [address]